# Patient Record
Sex: FEMALE | NOT HISPANIC OR LATINO | Employment: OTHER | URBAN - METROPOLITAN AREA
[De-identification: names, ages, dates, MRNs, and addresses within clinical notes are randomized per-mention and may not be internally consistent; named-entity substitution may affect disease eponyms.]

---

## 2021-03-18 ENCOUNTER — TELEPHONE (OUTPATIENT)
Dept: GASTROENTEROLOGY | Facility: CLINIC | Age: 71
End: 2021-03-18

## 2021-03-19 DIAGNOSIS — Z12.11 SCREENING FOR COLON CANCER: Primary | ICD-10-CM

## 2021-03-24 ENCOUNTER — OUTSIDE FACILITY SERVICE (OUTPATIENT)
Dept: GASTROENTEROLOGY | Facility: CLINIC | Age: 71
End: 2021-03-24

## 2021-03-24 PROCEDURE — 45380 COLONOSCOPY AND BIOPSY: CPT | Performed by: INTERNAL MEDICINE

## 2021-03-24 PROCEDURE — 45385 COLONOSCOPY W/LESION REMOVAL: CPT | Performed by: INTERNAL MEDICINE

## 2021-03-24 PROCEDURE — 88305 TISSUE EXAM BY PATHOLOGIST: CPT | Performed by: INTERNAL MEDICINE

## 2021-03-25 ENCOUNTER — LAB REQUISITION (OUTPATIENT)
Dept: LAB | Facility: HOSPITAL | Age: 71
End: 2021-03-25

## 2021-03-25 DIAGNOSIS — Z12.11 ENCOUNTER FOR SCREENING FOR MALIGNANT NEOPLASM OF COLON: ICD-10-CM

## 2021-03-26 LAB
CYTO UR: NORMAL
LAB AP CASE REPORT: NORMAL
LAB AP CLINICAL INFORMATION: NORMAL
PATH REPORT.FINAL DX SPEC: NORMAL
PATH REPORT.GROSS SPEC: NORMAL

## 2021-03-29 ENCOUNTER — TELEPHONE (OUTPATIENT)
Dept: GASTROENTEROLOGY | Facility: CLINIC | Age: 71
End: 2021-03-29

## 2021-03-29 NOTE — TELEPHONE ENCOUNTER
----- Message from Attila Lyn MD sent at 3/26/2021  2:54 PM EDT -----  Your tissue is benign without precancerous tissue.  I recommend a repeat colonoscopy in 5 years as suggested by your colonoscopy report .  It was nice to see you.

## 2021-10-22 ENCOUNTER — TELEPHONE (OUTPATIENT)
Dept: GASTROENTEROLOGY | Facility: CLINIC | Age: 71
End: 2021-10-22

## 2021-10-22 NOTE — TELEPHONE ENCOUNTER
"Patient called in and stated she's been having \"stomach problems\".  She's been experiencing abdominal pain and has had unintentional weight loss.  Her last EGD was in 2012 with Dr López and she saw you earlier this year for her colonoscopy.  OK to proceed with EGD or does she need to be seen in the office first?  Please advise.  "

## 2021-10-29 ENCOUNTER — OUTSIDE FACILITY SERVICE (OUTPATIENT)
Dept: GASTROENTEROLOGY | Facility: CLINIC | Age: 71
End: 2021-10-29

## 2021-10-29 PROCEDURE — 88305 TISSUE EXAM BY PATHOLOGIST: CPT | Performed by: INTERNAL MEDICINE

## 2021-10-29 PROCEDURE — 43239 EGD BIOPSY SINGLE/MULTIPLE: CPT | Performed by: INTERNAL MEDICINE

## 2021-11-01 ENCOUNTER — LAB REQUISITION (OUTPATIENT)
Dept: LAB | Facility: HOSPITAL | Age: 71
End: 2021-11-01

## 2021-11-01 DIAGNOSIS — R12 HEARTBURN: ICD-10-CM

## 2021-11-01 DIAGNOSIS — R63.4 ABNORMAL WEIGHT LOSS: ICD-10-CM

## 2021-11-01 DIAGNOSIS — K31.89 OTHER DISEASES OF STOMACH AND DUODENUM: ICD-10-CM

## 2022-01-05 ENCOUNTER — OFFICE VISIT (OUTPATIENT)
Dept: OBSTETRICS AND GYNECOLOGY | Facility: CLINIC | Age: 72
End: 2022-01-05

## 2022-01-05 VITALS
HEIGHT: 63 IN | WEIGHT: 132.6 LBS | DIASTOLIC BLOOD PRESSURE: 82 MMHG | SYSTOLIC BLOOD PRESSURE: 136 MMHG | BODY MASS INDEX: 23.5 KG/M2

## 2022-01-05 DIAGNOSIS — M81.0 OSTEOPOROSIS, UNSPECIFIED OSTEOPOROSIS TYPE, UNSPECIFIED PATHOLOGICAL FRACTURE PRESENCE: ICD-10-CM

## 2022-01-05 DIAGNOSIS — Z01.411 ENCOUNTER FOR GYNECOLOGICAL EXAMINATION WITH ABNORMAL FINDING: Primary | ICD-10-CM

## 2022-01-05 PROCEDURE — G0101 CA SCREEN;PELVIC/BREAST EXAM: HCPCS | Performed by: NURSE PRACTITIONER

## 2022-01-05 RX ORDER — MULTIPLE VITAMINS W/ MINERALS TAB 9MG-400MCG
1 TAB ORAL DAILY
COMMUNITY

## 2022-01-05 RX ORDER — ATORVASTATIN CALCIUM 20 MG/1
20 TABLET, FILM COATED ORAL
COMMUNITY
Start: 2021-12-13

## 2022-01-05 RX ORDER — PANTOPRAZOLE SODIUM 40 MG/1
40 TABLET, DELAYED RELEASE ORAL DAILY
COMMUNITY
Start: 2021-11-02

## 2022-01-05 RX ORDER — CYCLOSPORINE 0.5 MG/ML
EMULSION OPHTHALMIC
COMMUNITY

## 2022-01-05 RX ORDER — UBIDECARENONE 100 MG
100 CAPSULE ORAL DAILY
COMMUNITY
End: 2023-01-09

## 2022-01-05 RX ORDER — CETIRIZINE HYDROCHLORIDE 10 MG/1
TABLET ORAL
COMMUNITY

## 2022-01-05 RX ORDER — CALCIUM POLYCARBOPHIL 625 MG/1
CAPSULE ORAL
COMMUNITY
Start: 2021-11-03

## 2022-01-05 RX ORDER — HYDROCORTISONE ACETATE 25 MG/1
SUPPOSITORY RECTAL
COMMUNITY
Start: 2021-10-11

## 2022-01-05 RX ORDER — ZOSTER VACCINE RECOMBINANT, ADJUVANTED 50 MCG/0.5
KIT INTRAMUSCULAR
COMMUNITY

## 2022-01-05 RX ORDER — ESCITALOPRAM OXALATE 10 MG/1
10 TABLET ORAL DAILY
COMMUNITY
Start: 2021-12-13

## 2022-01-05 RX ORDER — CHLORAL HYDRATE 500 MG
1000 CAPSULE ORAL
COMMUNITY

## 2022-01-05 NOTE — PROGRESS NOTES
Chief Complaint  Priscilla Falk is a 71 y.o.  female presenting for Gynecologic Exam (former  patient.  annual exam. ? uterine prolapse.) and Back Pain (lower back pain.)    History of Present Illness  Very pleasant 70yo (appears younger than stated age), former pt at [facility], presenting for annual gyn exam.  She had a bad diarrhea and flare of hemorrhoids in October & when looking to ck the hemorrhoids, she noted something abn appearing at the vag introitus. No pelvic pain or pressure now.  No urinary problems.  But, does have some LBP.  She questions whether she might have some sort of pelvic organ prolapse.  Last pap  nl.  Last mammo 2021 (it was contrast enhanced because of the density);  She has planned for aleksey this yr.  Last colonoscopy 2021, 5 yr follow up was recommended.  Last DEXA  osteoporosis.  She can't tolerate any of the oral bisphosphonates.  Took Reclast infusion x 2 yrs, but states it didn't help at all.  She hasn't tried Prolia.  We are unsure of dates, so will sign GALEN & get records from .  If DEXA is due, will repeat that.  And then refer to [facility].      The following portions of the patient's history were reviewed and updated as appropriate: allergies, current medications, past family history, past medical history, past social history, past surgical history and problem list.    No Known Allergies      Current Outpatient Medications:   •  coenzyme Q10 100 MG capsule, Take 100 mg by mouth Daily., Disp: , Rfl:   •  multivitamin with minerals (MULTIVITAMIN ADULTS 50+ PO), Take 1 tablet by mouth Daily., Disp: , Rfl:   •  Omega-3 Fatty Acids (fish oil) 1000 MG capsule capsule, Take 1,000 mg by mouth Daily With Breakfast., Disp: , Rfl:   •  atorvastatin (LIPITOR) 20 MG tablet, Take 20 mg by mouth every night at bedtime., Disp: , Rfl:   •  cetirizine (zyrTEC) 10 MG tablet, cetirizine 10 mg tablet  TAKE 1 TABLET BY MOUTH EVERY NIGHT AT BEDTIME, Disp: , Rfl:   •   "cycloSPORINE (Restasis) 0.05 % ophthalmic emulsion, Restasis 0.05 % eye drops in a dropperette  INSTILL 1 DROP INTO AFFECTED EYE(S) BY OPHTHALMIC ROUTE EVERY 12 HOURS BOTH EYES, Disp: , Rfl:   •  escitalopram (LEXAPRO) 10 MG tablet, Take 10 mg by mouth Daily. as directed, Disp: , Rfl:   •  GNP Fiber-Caps 625 MG tablet, TAKE 1 TABLET BY MOUTH TWICE A DAY WITH 8 OZ OF WATER, MAY TAKE BOTH TABLETS ONCE DAILY AND INCREASE TO 4 AS TOLERATED, Disp: , Rfl:   •  hydrocortisone (ANUSOL-HC) 25 MG suppository, UNWRAP FROM FOIL AND INSERT 1 SUPPOSITORY RECTALLY AS NEEDED FOR HEMMORHOID PAIN, Disp: , Rfl:   •  pantoprazole (PROTONIX) 40 MG EC tablet, Take 40 mg by mouth Daily., Disp: , Rfl:   •  Zoster Vac Recomb Adjuvanted (Shingrix) 50 MCG/0.5ML reconstituted suspension, Shingrix (PF) 50 mcg/0.5 mL intramuscular suspension, kit, Disp: , Rfl:     Past Medical History:   Diagnosis Date   • Depression    • GERD (gastroesophageal reflux disease)    • Hyperlipidemia         Past Surgical History:   Procedure Laterality Date   • BUNIONECTOMY Left        Objective  /82   Ht 160 cm (63\")   Wt 60.1 kg (132 lb 9.6 oz)   Breastfeeding No   BMI 23.49 kg/m²     Physical Exam  Exam conducted with a chaperone present.   Constitutional:       Appearance: Normal appearance.   HENT:      Head: Normocephalic.   Neck:      Thyroid: No thyroid mass or thyromegaly.   Cardiovascular:      Rate and Rhythm: Normal rate and regular rhythm.      Heart sounds: Normal heart sounds.   Pulmonary:      Effort: Pulmonary effort is normal.      Breath sounds: Normal breath sounds.   Chest:   Breasts:      Right: No inverted nipple, mass, nipple discharge, axillary adenopathy or supraclavicular adenopathy.      Left: No inverted nipple, mass, nipple discharge, axillary adenopathy or supraclavicular adenopathy.       Abdominal:      Palpations: Abdomen is soft. There is no mass.      Tenderness: There is no abdominal tenderness.   Genitourinary:     " General: Normal vulva.      Vagina: Normal. No vaginal discharge or erythema.      Cervix: No discharge, lesion or erythema.      Uterus: Not enlarged, not tender and no uterine prolapse.       Adnexa:         Right: No mass or tenderness.          Left: No mass or tenderness.        Comments: Reassured re: no pelvic organ prolapse.  The uterus and vag walls are still in good positions.  Cx is at least 6-7cm up inside the vag vault with pelvic exam.    Anus appears wnl.  No rectal exam performed.  Lymphadenopathy:      Upper Body:      Right upper body: No supraclavicular or axillary adenopathy.      Left upper body: No supraclavicular or axillary adenopathy.   Neurological:      Mental Status: She is alert.         Assessment/Plan   Diagnoses and all orders for this visit:    1. Encounter for gynecological examination with abnormal finding (Primary)    2. Osteoporosis, unspecified osteoporosis type, unspecified pathological fracture presence    Sign GALEN & get records.  To get DEXA now if due.  Then refer to [facility].    We discussed Ca 1,200 mg/d (total intake with food and/or supplements) and Vit D 2,000 IU/day.  Couns re: walking & wt bearing exercise.  Couns re: SBE, mammo & other pvt health procedures.      Procedures        Return in about 1 year (around 1/5/2023) for Annual physical.    Heather Cantrell, APRN  01/05/2022

## 2023-01-09 ENCOUNTER — OFFICE VISIT (OUTPATIENT)
Dept: OBSTETRICS AND GYNECOLOGY | Facility: CLINIC | Age: 73
End: 2023-01-09
Payer: MEDICARE

## 2023-01-09 VITALS
BODY MASS INDEX: 24.7 KG/M2 | WEIGHT: 139.4 LBS | HEIGHT: 63 IN | SYSTOLIC BLOOD PRESSURE: 132 MMHG | DIASTOLIC BLOOD PRESSURE: 86 MMHG

## 2023-01-09 DIAGNOSIS — Z87.39 HISTORY OF OSTEOPOROSIS: ICD-10-CM

## 2023-01-09 DIAGNOSIS — Z13.820 SCREENING FOR OSTEOPOROSIS: ICD-10-CM

## 2023-01-09 DIAGNOSIS — Z01.419 ENCOUNTER FOR GYNECOLOGICAL EXAMINATION WITHOUT ABNORMAL FINDING: Primary | ICD-10-CM

## 2023-01-09 DIAGNOSIS — Z78.0 POSTMENOPAUSAL: ICD-10-CM

## 2023-01-09 DIAGNOSIS — Z12.31 ENCOUNTER FOR SCREENING MAMMOGRAM FOR MALIGNANT NEOPLASM OF BREAST: ICD-10-CM

## 2023-01-09 PROCEDURE — 99397 PER PM REEVAL EST PAT 65+ YR: CPT | Performed by: NURSE PRACTITIONER

## 2023-01-09 RX ORDER — TRIAMCINOLONE ACETONIDE 1 MG/G
CREAM TOPICAL
COMMUNITY
Start: 2022-12-14

## 2023-01-09 RX ORDER — LORATADINE 10 MG/1
1 TABLET ORAL DAILY
COMMUNITY

## 2023-01-09 RX ORDER — EZETIMIBE 10 MG/1
10 TABLET ORAL DAILY
COMMUNITY
Start: 2022-12-31

## 2023-01-09 NOTE — PROGRESS NOTES
Chief Complaint  Priscilla Falk is a 72 y.o.  female presenting for Gynecologic Exam    History of Present Illness  Priscilla is a very pleasant 73yo woman, , here for annual gyn exam.  She uses no HRT (systemic or local), and feels that she is doing fine without it.  No gyn c/o's or urinary problems or bowel problems.  She does have PMHx osteoporosis, and tries to be intentional re: calcium, vit D intake, and lots of walking and wt bearing exercise. She is due for another DEXA scan.  Her mammograms are up to date, but last one is not in the chart.  She will be due within the next one month, and will set it up.  Pap smear & colonoscopy up to date.      The following portions of the patient's history were reviewed and updated as appropriate: allergies, current medications, past family history, past medical history, past social history, past surgical history and problem list.    No Known Allergies      Current Outpatient Medications:   •  atorvastatin (LIPITOR) 20 MG tablet, Take 20 mg by mouth every night at bedtime., Disp: , Rfl:   •  cetirizine (zyrTEC) 10 MG tablet, cetirizine 10 mg tablet  TAKE 1 TABLET BY MOUTH EVERY NIGHT AT BEDTIME, Disp: , Rfl:   •  cycloSPORINE (Restasis) 0.05 % ophthalmic emulsion, Restasis 0.05 % eye drops in a dropperette  INSTILL 1 DROP INTO AFFECTED EYE(S) BY OPHTHALMIC ROUTE EVERY 12 HOURS BOTH EYES, Disp: , Rfl:   •  escitalopram (LEXAPRO) 10 MG tablet, Take 10 mg by mouth Daily. as directed, Disp: , Rfl:   •  ezetimibe (ZETIA) 10 MG tablet, Take 10 mg by mouth Daily., Disp: , Rfl:   •  GNP Fiber-Caps 625 MG tablet, TAKE 1 TABLET BY MOUTH TWICE A DAY WITH 8 OZ OF WATER, MAY TAKE BOTH TABLETS ONCE DAILY AND INCREASE TO 4 AS TOLERATED, Disp: , Rfl:   •  hydrocortisone (ANUSOL-HC) 25 MG suppository, UNWRAP FROM FOIL AND INSERT 1 SUPPOSITORY RECTALLY AS NEEDED FOR HEMMORHOID PAIN, Disp: , Rfl:   •  loratadine (CLARITIN) 10 MG tablet, Take 1 tablet by mouth Daily., Disp: , Rfl:   •   multivitamin with minerals (MULTIVITAMIN ADULTS 50+ PO), Take 1 tablet by mouth Daily., Disp: , Rfl:   •  Omega-3 Fatty Acids (fish oil) 1000 MG capsule capsule, Take 1,000 mg by mouth Daily With Breakfast., Disp: , Rfl:   •  pantoprazole (PROTONIX) 40 MG EC tablet, Take 40 mg by mouth Daily., Disp: , Rfl:   •  triamcinolone (KENALOG) 0.1 % cream, APPLY A THIN FILM OF CREAM EXTERNALLY TO THE AFFECTED SKIN AREAS TWICE DAILY, Disp: , Rfl:   •  Zoster Vac Recomb Adjuvanted (Shingrix) 50 MCG/0.5ML reconstituted suspension, Shingrix (PF) 50 mcg/0.5 mL intramuscular suspension, kit, Disp: , Rfl:     Past Medical History:   Diagnosis Date   • Depression    • Family history of breast cancer in sister    • GERD (gastroesophageal reflux disease)    • Hives of unknown origin    • Hyperlipidemia    • Menopause    • Ruptured thoracic disc         Past Surgical History:   Procedure Laterality Date   • BUNIONECTOMY Left        Objective  /86   Ht 160 cm (63\")   Wt 63.2 kg (139 lb 6.4 oz)   Breastfeeding No   BMI 24.69 kg/m²     Physical Exam  Vitals and nursing note reviewed. Exam conducted with a chaperone present.   Constitutional:       Appearance: Normal appearance. She is normal weight.   HENT:      Head: Normocephalic.   Neck:      Thyroid: No thyroid mass or thyromegaly.   Cardiovascular:      Rate and Rhythm: Normal rate and regular rhythm.      Heart sounds: Normal heart sounds. No murmur heard.  Pulmonary:      Effort: Pulmonary effort is normal. No respiratory distress.      Breath sounds: Normal breath sounds.   Chest:   Breasts:     Right: No inverted nipple, mass or nipple discharge.      Left: No inverted nipple, mass or nipple discharge.   Abdominal:      Palpations: Abdomen is soft. There is no mass.      Tenderness: There is no abdominal tenderness.   Genitourinary:     General: Normal vulva.      Labia:         Right: No rash, tenderness or lesion.         Left: No rash, tenderness or lesion.        Vagina: Normal. No vaginal discharge or erythema.      Cervix: No discharge, lesion or erythema.      Uterus: Not enlarged and not tender.       Adnexa:         Right: No mass or tenderness.          Left: No mass or tenderness.        Comments: Anus appears wnl.  No rectal exam performed.  Lymphadenopathy:      Upper Body:      Right upper body: No supraclavicular or axillary adenopathy.      Left upper body: No supraclavicular or axillary adenopathy.   Skin:     General: Skin is warm and dry.   Neurological:      Mental Status: She is alert and oriented to person, place, and time.   Psychiatric:         Mood and Affect: Mood normal.         Behavior: Behavior normal.         Assessment/Plan   Diagnoses and all orders for this visit:    1. Encounter for gynecological examination without abnormal finding (Primary)    2. History of osteoporosis  -     DEXA Bone Density Axial; Future    3. Postmenopausal  -     DEXA Bone Density Axial; Future    4. Screening for osteoporosis  -     DEXA Bone Density Axial; Future    5. Encounter for screening mammogram for malignant neoplasm of breast    Couns re: calcium, vit D, walking & wt bearing exercise.  She will sched the mammogram on her own.      Procedures    40 to 64: Counseling/Anticipatory Guidance Discussed: nutrition, physical activity, screenings and self-breast exam    Return in about 1 year (around 1/9/2024) for Annual physical.    Heather Cantrell, APRN  01/09/2023

## 2023-03-16 ENCOUNTER — TELEPHONE (OUTPATIENT)
Dept: OBSTETRICS AND GYNECOLOGY | Facility: CLINIC | Age: 73
End: 2023-03-16

## 2023-03-16 NOTE — TELEPHONE ENCOUNTER
Caller: Priscilla Falk    Relationship: Self    Best call back number: [phone]    What orders are you requesting (i.e. lab or imaging): BONE DENSITY SCAN    In what timeframe would the patient need to come in: AS SOON AS POSSIBLE    Where will you receive your lab/imaging services: [facility]    Additional notes: PT STATES THE CURRENT ORDER IS FOR [facility] - SHE NEEDS THE ORDER TO GO TO [facility] WHERE SHE HAS ALWAYS RECEIVED THESE SCANS.    PLEASE CALL PT TO CONFIRM THE ORDER HAS BEEN CHANGED